# Patient Record
Sex: MALE | Race: WHITE | Employment: FULL TIME | URBAN - METROPOLITAN AREA
[De-identification: names, ages, dates, MRNs, and addresses within clinical notes are randomized per-mention and may not be internally consistent; named-entity substitution may affect disease eponyms.]

---

## 2017-04-14 ENCOUNTER — HOSPITAL ENCOUNTER (EMERGENCY)
Facility: HOSPITAL | Age: 22
Discharge: HOME | End: 2017-04-14
Attending: EMERGENCY MEDICINE
Payer: COMMERCIAL

## 2017-04-14 ENCOUNTER — APPOINTMENT (OUTPATIENT)
Dept: GENERAL RADIOLOGY | Facility: HOSPITAL | Age: 22
End: 2017-04-14
Attending: EMERGENCY MEDICINE
Payer: COMMERCIAL

## 2017-04-14 VITALS
WEIGHT: 200 LBS | DIASTOLIC BLOOD PRESSURE: 72 MMHG | TEMPERATURE: 99 F | HEIGHT: 73 IN | BODY MASS INDEX: 26.51 KG/M2 | HEART RATE: 55 BPM | RESPIRATION RATE: 17 BRPM | OXYGEN SATURATION: 95 % | SYSTOLIC BLOOD PRESSURE: 121 MMHG

## 2017-04-14 DIAGNOSIS — S43.005A SHOULDER DISLOCATION, LEFT, INITIAL ENCOUNTER: Primary | ICD-10-CM

## 2017-04-14 DIAGNOSIS — M21.822 HILL SACHS DEFORMITY, LEFT: ICD-10-CM

## 2017-04-14 PROCEDURE — 73020 X-RAY EXAM OF SHOULDER: CPT

## 2017-04-14 PROCEDURE — 73030 X-RAY EXAM OF SHOULDER: CPT

## 2017-04-14 PROCEDURE — 96375 TX/PRO/DX INJ NEW DRUG ADDON: CPT

## 2017-04-14 PROCEDURE — 99284 EMERGENCY DEPT VISIT MOD MDM: CPT

## 2017-04-14 PROCEDURE — 23650 CLTX SHO DSLC W/MNPJ WO ANES: CPT

## 2017-04-14 PROCEDURE — 96376 TX/PRO/DX INJ SAME DRUG ADON: CPT

## 2017-04-14 PROCEDURE — 73010 X-RAY EXAM OF SHOULDER BLADE: CPT

## 2017-04-14 PROCEDURE — 96374 THER/PROPH/DIAG INJ IV PUSH: CPT

## 2017-04-14 RX ORDER — HYDROMORPHONE HYDROCHLORIDE 1 MG/ML
1 INJECTION, SOLUTION INTRAMUSCULAR; INTRAVENOUS; SUBCUTANEOUS ONCE
Status: COMPLETED | OUTPATIENT
Start: 2017-04-14 | End: 2017-04-14

## 2017-04-14 RX ORDER — ONDANSETRON 2 MG/ML
INJECTION INTRAMUSCULAR; INTRAVENOUS
Status: COMPLETED
Start: 2017-04-14 | End: 2017-04-14

## 2017-04-14 RX ORDER — ONDANSETRON 2 MG/ML
4 INJECTION INTRAMUSCULAR; INTRAVENOUS ONCE
Status: COMPLETED | OUTPATIENT
Start: 2017-04-14 | End: 2017-04-14

## 2017-04-14 RX ORDER — HYDROMORPHONE HYDROCHLORIDE 1 MG/ML
INJECTION, SOLUTION INTRAMUSCULAR; INTRAVENOUS; SUBCUTANEOUS
Status: COMPLETED
Start: 2017-04-14 | End: 2017-04-14

## 2017-04-14 RX ORDER — IBUPROFEN 600 MG/1
600 TABLET ORAL EVERY 6 HOURS PRN
Qty: 30 TABLET | Refills: 0 | Status: SHIPPED | OUTPATIENT
Start: 2017-04-14 | End: 2017-04-21

## 2017-04-14 NOTE — ED INITIAL ASSESSMENT (HPI)
Patient was walking, missed the curb an fell onto concrete onto left shoulder with + deformity noted. +CMS. Denies hitting head or loc.  100mcg fentanyl given IVP per medics pta

## 2017-04-14 NOTE — ED PROVIDER NOTES
Patient Seen in: [facility] Emergency Department    History   Patient presents with:  Upper Extremity Injury (musculoskeletal)    Stated Complaint: Possible left shoulder dislocation    HPI    23-year-old male tripped and fell onto his left arm. normal heart sounds and intact distal pulses. Pulmonary/Chest: Effort normal and breath sounds normal.   Abdominal: Soft. Bowel sounds are normal. He exhibits no distension and no mass. There is no tenderness. There is no rebound and no guarding.    Musc

## 2017-04-14 NOTE — ED NOTES
Shoulder immobilizer applied to patient left arm per Joyce Whitaker PCT. +CMS pre and post application. Pt instructed on when it was appropriate to wear immobilizer as well as duration and follow up care.

## (undated) NOTE — ED AVS SNAPSHOT
[facility] Emergency Department    [address]     [address] [place] [address]    Phone:  [phone]    Fax:  [phone]           Rodolfo Soriano   MRN: [id number]    Department:  [facility] Emergency Department   Date of Visit:  4/14/ Insurance plans vary and the physician(s) referred by the ER may not be covered by your plan. Please contact your insurance company to determine coverage and benefits available for follow-up care and referrals.       If you have difficulty scheduling your prescription right away and begin taking the medication(s) as directed.   If you believe that any of the medications or instructions on this list is different from what your Primary Care doctor has instructed you - please continue to take your medications a Patient [address] to help you get signed up for insurance coverage. [employer] [address] is a Federal Navigator program that can help with your Affordable Care Act coverage, as well as all types of Medicaid plans.   To get signed up and covere

## (undated) NOTE — ED AVS SNAPSHOT
[facility] Emergency Department    [address]     [address] [place] [address]    Phone:  [phone]    Fax:  [phone]           [facility]   MRN: [id number]    Department:  [facility] Emergency Department   Date of Visit:  4/14/ and Class Registration line at [phone] or find a doctor online by visiting [email or web address].    IF THERE IS ANY CHANGE OR WORSENING OF YOUR CONDITION, CALL YOUR PRIMARY CARE PHYSICIAN AT ONCE OR RETURN IMMEDIATELY TO [address] [place] [address].     If